# Patient Record
Sex: FEMALE | Race: OTHER | NOT HISPANIC OR LATINO | URBAN - METROPOLITAN AREA
[De-identification: names, ages, dates, MRNs, and addresses within clinical notes are randomized per-mention and may not be internally consistent; named-entity substitution may affect disease eponyms.]

---

## 2021-08-27 ENCOUNTER — IMPORTED ENCOUNTER (OUTPATIENT)
Dept: URBAN - METROPOLITAN AREA CLINIC 1 | Facility: CLINIC | Age: 56
End: 2021-08-27

## 2021-08-27 PROBLEM — H52.223: Noted: 2021-08-27

## 2021-08-27 PROBLEM — H52.12: Noted: 2021-08-27

## 2021-08-27 PROBLEM — H52.4: Noted: 2021-08-27

## 2021-08-27 PROCEDURE — S0620 ROUTINE OPHTHALMOLOGICAL EXA: HCPCS

## 2021-08-27 NOTE — PATIENT DISCUSSION
1. Myopia OS w/ Astigmatism OU -- Rx was given for correction if indicated and requested. 2. Presbyopia 3. Cataract OU -- Observe. 4.  Dry Eyes OU -- Recommended ATs BID-TID OU routinely (Sample of Refresh Relieva given). Return for an appointment in 1 year 36 with Dr. Iman Du.

## 2022-04-02 ASSESSMENT — VISUAL ACUITY
OS_CC: 20/20
OD_SC: 20/25
OD_SC: J5
OS_SC: J5
OS_SC: 20/20
OD_CC: 20/20

## 2022-04-02 ASSESSMENT — TONOMETRY
OS_IOP_MMHG: 14
OD_IOP_MMHG: 14

## 2022-04-06 ENCOUNTER — EMERGENCY VISIT (OUTPATIENT)
Dept: URBAN - METROPOLITAN AREA CLINIC 1 | Facility: CLINIC | Age: 57
End: 2022-04-06

## 2022-04-06 DIAGNOSIS — H25.813: ICD-10-CM

## 2022-04-06 DIAGNOSIS — H04.123: ICD-10-CM

## 2022-04-06 DIAGNOSIS — H00.14: ICD-10-CM

## 2022-04-06 PROCEDURE — 92012 INTRM OPH EXAM EST PATIENT: CPT

## 2022-04-06 ASSESSMENT — VISUAL ACUITY
OS_SC: 20/20
OD_SC: 20/20

## 2022-04-06 ASSESSMENT — TONOMETRY
OD_IOP_MMHG: 13
OS_IOP_MMHG: 13

## 2022-04-06 NOTE — PATIENT DISCUSSION
Patient states compliant with Hot compresses without resolution. Recommend continue hot compresses; recommend TID x 5 minutes. Discussed option for Incision and Drainage. Risks and Benefits discussed with patient. Patient stated complete understanding and would like to proceed with procedure. Will plan I&D with [facility].